# Patient Record
Sex: FEMALE | Race: OTHER | HISPANIC OR LATINO | URBAN - METROPOLITAN AREA
[De-identification: names, ages, dates, MRNs, and addresses within clinical notes are randomized per-mention and may not be internally consistent; named-entity substitution may affect disease eponyms.]

---

## 2024-11-18 ENCOUNTER — EMERGENCY (EMERGENCY)
Facility: HOSPITAL | Age: 49
LOS: 1 days | Discharge: HOME | End: 2024-11-18
Attending: EMERGENCY MEDICINE | Admitting: EMERGENCY MEDICINE
Payer: SELF-PAY

## 2024-11-18 VITALS
TEMPERATURE: 98 F | WEIGHT: 110.01 LBS | OXYGEN SATURATION: 98 % | DIASTOLIC BLOOD PRESSURE: 89 MMHG | SYSTOLIC BLOOD PRESSURE: 147 MMHG | RESPIRATION RATE: 16 BRPM | HEART RATE: 61 BPM

## 2024-11-18 LAB
ALBUMIN SERPL ELPH-MCNC: 2.9 G/DL — LOW (ref 3.4–5)
ALP SERPL-CCNC: 85 U/L — SIGNIFICANT CHANGE UP (ref 40–120)
ALT FLD-CCNC: 40 U/L — SIGNIFICANT CHANGE UP (ref 12–42)
ANION GAP SERPL CALC-SCNC: 4 MMOL/L — LOW (ref 9–16)
APAP SERPL-MCNC: <2 UG/ML — SIGNIFICANT CHANGE UP (ref 10–30)
APPEARANCE UR: CLEAR — SIGNIFICANT CHANGE UP
APTT BLD: 27.2 SEC — SIGNIFICANT CHANGE UP (ref 24.5–35.6)
AST SERPL-CCNC: 29 U/L — SIGNIFICANT CHANGE UP (ref 15–37)
BILIRUB SERPL-MCNC: 0.9 MG/DL — SIGNIFICANT CHANGE UP (ref 0.2–1.2)
BILIRUB UR-MCNC: NEGATIVE — SIGNIFICANT CHANGE UP
BUN SERPL-MCNC: 15 MG/DL — SIGNIFICANT CHANGE UP (ref 7–23)
CALCIUM SERPL-MCNC: 8 MG/DL — LOW (ref 8.5–10.5)
CHLORIDE SERPL-SCNC: 110 MMOL/L — HIGH (ref 96–108)
CK MB BLD-MCNC: 1.45 % — SIGNIFICANT CHANGE UP
CK MB CFR SERPL CALC: 2.4 NG/ML — SIGNIFICANT CHANGE UP (ref 0.5–3.6)
CK SERPL-CCNC: 165 U/L — SIGNIFICANT CHANGE UP (ref 26–192)
CO2 SERPL-SCNC: 28 MMOL/L — SIGNIFICANT CHANGE UP (ref 22–31)
COLOR SPEC: YELLOW — SIGNIFICANT CHANGE UP
CREAT SERPL-MCNC: 0.99 MG/DL — SIGNIFICANT CHANGE UP (ref 0.5–1.3)
DIFF PNL FLD: NEGATIVE — SIGNIFICANT CHANGE UP
EGFR: 71 ML/MIN/1.73M2 — SIGNIFICANT CHANGE UP
ETHANOL SERPL-MCNC: <3 MG/DL — SIGNIFICANT CHANGE UP
GLUCOSE SERPL-MCNC: 224 MG/DL — HIGH (ref 70–99)
GLUCOSE UR QL: 500 MG/DL
HCG SERPL-ACNC: 1 MIU/ML — SIGNIFICANT CHANGE UP
HCT VFR BLD CALC: 32.6 % — LOW (ref 34.5–45)
HGB BLD-MCNC: 10.5 G/DL — LOW (ref 11.5–15.5)
INR BLD: 1.03 — SIGNIFICANT CHANGE UP (ref 0.85–1.16)
KETONES UR-MCNC: NEGATIVE MG/DL — SIGNIFICANT CHANGE UP
LEUKOCYTE ESTERASE UR-ACNC: NEGATIVE — SIGNIFICANT CHANGE UP
MAGNESIUM SERPL-MCNC: 1.9 MG/DL — SIGNIFICANT CHANGE UP (ref 1.6–2.6)
MCHC RBC-ENTMCNC: 32.2 G/DL — SIGNIFICANT CHANGE UP (ref 32–36)
MCHC RBC-ENTMCNC: 33.7 PG — SIGNIFICANT CHANGE UP (ref 27–34)
MCV RBC AUTO: 104.5 FL — HIGH (ref 80–100)
NITRITE UR-MCNC: NEGATIVE — SIGNIFICANT CHANGE UP
NRBC # BLD: 0 /100 WBCS — SIGNIFICANT CHANGE UP (ref 0–0)
PCP SPEC-MCNC: SIGNIFICANT CHANGE UP
PH UR: 6.5 — SIGNIFICANT CHANGE UP (ref 5–8)
PLATELET # BLD AUTO: 271 K/UL — SIGNIFICANT CHANGE UP (ref 150–400)
POTASSIUM SERPL-MCNC: 3.8 MMOL/L — SIGNIFICANT CHANGE UP (ref 3.5–5.3)
POTASSIUM SERPL-SCNC: 3.8 MMOL/L — SIGNIFICANT CHANGE UP (ref 3.5–5.3)
PROT SERPL-MCNC: 6.4 G/DL — SIGNIFICANT CHANGE UP (ref 6.4–8.2)
PROT UR-MCNC: NEGATIVE MG/DL — SIGNIFICANT CHANGE UP
PROTHROM AB SERPL-ACNC: 11.9 SEC — SIGNIFICANT CHANGE UP (ref 9.9–13.4)
RBC # BLD: 3.12 M/UL — LOW (ref 3.8–5.2)
RBC # FLD: 13.7 % — SIGNIFICANT CHANGE UP (ref 10.3–14.5)
SALICYLATES SERPL-MCNC: <2 MG/DL — SIGNIFICANT CHANGE UP (ref 2.8–20)
SODIUM SERPL-SCNC: 142 MMOL/L — SIGNIFICANT CHANGE UP (ref 132–145)
SP GR SPEC: 1.02 — SIGNIFICANT CHANGE UP (ref 1–1.03)
TROPONIN I, HIGH SENSITIVITY RESULT: 21.9 NG/L — SIGNIFICANT CHANGE UP
UROBILINOGEN FLD QL: 1 MG/DL — SIGNIFICANT CHANGE UP (ref 0.2–1)
WBC # BLD: 5.97 K/UL — SIGNIFICANT CHANGE UP (ref 3.8–10.5)
WBC # FLD AUTO: 5.97 K/UL — SIGNIFICANT CHANGE UP (ref 3.8–10.5)

## 2024-11-18 PROCEDURE — 71045 X-RAY EXAM CHEST 1 VIEW: CPT | Mod: 26

## 2024-11-18 PROCEDURE — 99285 EMERGENCY DEPT VISIT HI MDM: CPT

## 2024-11-18 RX ORDER — SODIUM CHLORIDE 9 MG/ML
1000 INJECTION, SOLUTION INTRAMUSCULAR; INTRAVENOUS; SUBCUTANEOUS ONCE
Refills: 0 | Status: COMPLETED | OUTPATIENT
Start: 2024-11-18 | End: 2024-11-18

## 2024-11-18 RX ADMIN — SODIUM CHLORIDE 1000 MILLILITER(S): 9 INJECTION, SOLUTION INTRAMUSCULAR; INTRAVENOUS; SUBCUTANEOUS at 16:20

## 2024-11-18 RX ADMIN — SODIUM CHLORIDE 250 MILLILITER(S): 9 INJECTION, SOLUTION INTRAMUSCULAR; INTRAVENOUS; SUBCUTANEOUS at 17:15

## 2024-11-18 NOTE — ED PROVIDER NOTE - PATIENT PORTAL LINK FT
You can access the FollowMyHealth Patient Portal offered by [facility] by registering at the following website: [email or web address]. By joining Company’s FollowMyHealth portal, you will also be able to view your health information using other applications (apps) compatible with our system.

## 2024-11-18 NOTE — ED PROVIDER NOTE - NSFOLLOWUPINSTRUCTIONS_ED_ALL_ED_FT
Follow up with your primary care doctor or clinics listed below if you do [place] [address]  To make an appointment, call [phone]  [facility]  Address: [address] [place] [address]  Appointment Center: [phone] ([phone])    Follow up with your primary care doctor or clinics listed below if you do [place] [address]  To make an appointment, call [phone]  [facility]  Address: [address] [place] [address]  Appointment Center: [phone] ([phone])    Intoxicación accidental por drogas en los adultos  Accidental Drug Poisoning, Adult  Jayden intoxicación accidental por drogas ocurre cuando jayden persona rita accidentalmente demasiada cantidad de jayden sustancia marta, por ejemplo, un medicamento recetado, un medicamento de venta mejia, jayden vitamina, un suplemento o jayden droga ilegal. Los efectos de la intoxicación por drogas pueden ser leves, peligrosos o, incluso, mortales.    ¿Cuáles son las causas?  Esta afección se genera cuando se rita demasiada cantidad de un medicamento, jayden droga ilegal u otra sustancia. Por lo general, es consecuencia de lo siguiente:  Falta de información sobre jayden sustancia.  Usar más de jayden sustancia al mismo tiempo.  Un error cometido por el profesional de la antoni al prescribir o dispensar el fármaco.  Un lapsus de la memoria, marta olvidarse que ya tomó jayden dosis del medicamento.  Usar, de repente, alguna sustancia después de un antonella período de no usarla.  Las siguientes sustancias y medicamentos son más propensos a causar jayden intoxicación accidental por drogas:  Medicamentos para tratar afecciones de antoni mental (medicamentos psicotrópicos).  Analgésicos.  Cocaína.  Heroína.  Multivitaminas que contengan gely.  Medicamentos de venta mejia para el resfrío y la tos.  ¿Qué incrementa el riesgo?  Es más probable que esta afección ocurra en:  Los adultos mayores. Los adultos mayores corren riesgo debido a que pueden:  Estar tomando diferentes medicamentos.  Tener dificultades para leer las etiquetas.  Olvidarse de cuándo tomaron el medicamento por última vez.  Las personas que consumen drogas ilegales.  Personas que beben alcohol mientras usan drogas ilegales o ciertos medicamentos.  Personas que tienen determinadas enfermedades mentales.  ¿Cuáles son los signos o síntomas?  Los síntomas de esta afección dependen de la sustancia y de la cantidad ingerida. Los síntomas frecuentes incluyen los siguientes:  Los cambios de conducta marta por ejemplo, confusión.  Somnolencia.  Debilidad.  Respiración lenta.  Náuseas y vómitos.  Convulsiones.  Tamaño muy chritsian o pequeño de la pupila que no cambia en respuesta a los cambios en la christophe.  Jayden intoxicación por drogas puede causar jayden afección muy grave en la cual la presión arterial  a un nivel bajo (choque). Los síntomas del choque incluyen los siguientes:  Piel fría, húmeda o pálida.  Labios azules.  Respiración muy lenta.  Mucha somnolencia.  Confusión grave.  Mareos o desmayos.  ¿Cómo se diagnostica?  Esta afección se diagnostica en función de lo siguiente:  Priscilla síntomas. Se le preguntará sobre las sustancias consumió y cuándo lo hizo.  Un examen físico.  También pueden hacerle pruebas, que incluyen las siguientes:  Análisis de orina.  Análisis de kwadwo.  Un electrocardiograma (ECG).  ¿Cómo se trata?  Es probable que esta afección se trate de inmediato en el hospital. El tratamiento puede implicar lo siguiente:  Recibir líquidos y electrolitos por vía intravenosa. Los electrolitos son sales y minerales en la kwadwo.  La colocación de un tubo de respiración (tubo endotraqueal) en las vías respiratorias para ayudarlo a respirar.  Manjeet o recibir medicamentos. Estos pueden incluir medicamentos para lo siguiente:  Absorber cualquier sustancia que se encuentre en el aparato digestivo.  Inhibir o revertir el efecto de la sustancia que causó la intoxicación.  Filtrar la kwadwo a través de un riñón artificial (hemodiálisis).  Asesoramiento psicológico y asistencia de antoni mental. Probablemente le indiquen esto si la sobredosis fue causada por el consumo de drogas.  Siga estas instrucciones en rose casa:  Medicamentos    A prescription pill bottle with an example of a pill.  [place] los medicamentos de venta mejia y los recetados solamente marta se lo haya indicado el médico.  Antes de manjeet un nuevo medicamento, pregunte a rose médico si el medicamento:  Pueden provocar efectos secundarios.  Podría reaccionar con otros medicamentos.  Tenga un listado de todos los medicamentos que rita, incluso los medicamentos de venta mejia, vitaminas, suplementos y hierbas. Lleve esta lista con usted a todas las consultas con el médico.  Instrucciones generales    Three cups showing dark yellow, yellow, and pale yellow urine.  Rena suficiente líquido marta para mantener la orina de color amarillo pálido.  Si se atiende con un psicólogo o profesional de la antoni mental, asegúrese de seguir priscilla instrucciones.  No rena alcohol si:  Rose médico le indica no hacerlo.  Está embarazada, puede estar embarazada o está tratando de quedar embarazada.  Si padma alcohol:  Limite la cantidad que padma a lo siguiente:  De 0 a 1 medida por día para las mujeres.  De 0 a 2 medidas por día para los hombres.  Sepa cuánta cantidad de alcohol hay en las bebidas que rita. En los [place], jayden medida equivale a jayden botella de cerveza de 12 oz (355 ml), un vaso de vino de 5 oz (148 ml) o un vaso de jayden bebida alcohólica de papa graduación de 1½ oz (44 ml).  Concurra a todas las visitas de seguimiento. [place] es importante.  ¿Cómo se rosalie?  A pillbox with seven slots for storing pills. The lids for three slots are open, showing pills in two of the slots.   Trate de obtener ayuda si está luchando contra:  El consumo de alcohol o drogas.  La depresión u otra afección de antoni mental.  Tenga el número telefónico del centro de toxicología local cerca de rose teléfono fijo o agéndelo en rose teléfono móvil. La línea directa de la American Association of Poison Control Centers (Asociación Estadounidense de Centros de Control de Intoxicaciones) es [phone].  Rianna los prospectos que vienen con los medicamentos.  Sanjuanita un sistema para manjeet los medicamentos por ejemplo, usar un pastillero, que lo ayudará a evitar que tome de más.  No rena alcohol mientras rita medicamentos a menos que el médico lo autorice.  No consuma drogas ilegales.  No tome medicamentos que no le haya recetado el médico.  Comuníquese con un médico si:  Los síntomas vuelven a aparecer.  Le aparecen síntomas nuevos o efectos secundarios luego de manjeet los medicamentos.  Tiene preguntas sobre jayden posible intoxicación por drogas. Llame a [facility] de toxicología local al [phone].  Solicite ayuda de inmediato si:  Sanjuanita que usted u otra persona rodriguez consumido jayden cantidad excesiva de jayden sustancia.  Usted u otra persona tienen síntomas de jayden intoxicación accidental por drogas:  Los cambios de conducta marta por ejemplo, confusión.  Somnolencia.  Respiración lenta.  Convulsiones.  Estos síntomas pueden indicar jayden emergencia. Solicite ayuda de inmediato. Llame al 911.  No espere a raquel si los síntomas desaparecen.  No conduzca por priscilla propios medios hasta el hospital.  Resumen  Jayden intoxicación accidental por drogas ocurre cuando jayden persona rita accidentalmente demasiada cantidad de jayden sustancia marta, por ejemplo, un medicamento recetado, un medicamento de venta mejia, jayden vitamina, un suplemento o jayden droga ilegal.  Esta afección se diagnostica en función de los síntomas y de un examen físico. Rose médico le preguntará que sustancias consumió y cuándo.  Los efectos de la intoxicación por drogas pueden ser leves, peligrosos o, incluso, mortales.  Es probable que esta afección se trate de inmediato en el hospital. Follow up with your primary care doctor or clinics listed below if you do not have a doctor  [address] [address]  To make an appointment, call [phone]  [facility]  Address:  [address] [place] [address]  Appointment Center: [phone] ([phone])    Josiah un seguimiento con rose médico de atención primaria o las clínicas que se enumeran a continuación si no tiene un médico  [place] [address], [place] [address]  Para programar jayden johanna, alona melvin [phone]  [facility]  Dirección:  [address] [place] [address]  Centro de citas: [phone] ([phone])    Intoxicación accidental por drogas en los adultos  Accidental Drug Poisoning, Adult  Jayden intoxicación accidental por drogas ocurre cuando jayden persona rita accidentalmente demasiada cantidad de jayden sustancia marta, por ejemplo, un medicamento recetado, un medicamento de venta mejia, jayden vitamina, un suplemento o jayden droga ilegal. Los efectos de la intoxicación por drogas pueden ser leves, peligrosos o, incluso, mortales.    ¿Cuáles son las causas?  Esta afección se genera cuando se rita demasiada cantidad de un medicamento, jayden droga ilegal u otra sustancia. Por lo general, es consecuencia de lo siguiente:  Falta de información sobre jayden sustancia.  Usar más de jayden sustancia al mismo tiempo.  Un error cometido por el profesional de la antoni al prescribir o dispensar el fármaco.  Un lapsus de la memoria, marta olvidarse que ya tomó jayden dosis del medicamento.  Usar, de repente, alguna sustancia después de un antonella período de no usarla.  Las siguientes sustancias y medicamentos son más propensos a causar jayden intoxicación accidental por drogas:  Medicamentos para tratar afecciones de antoni mental (medicamentos psicotrópicos).  Analgésicos.  Cocaína.  Heroína.  Multivitaminas que contengan gely.  Medicamentos de venta mejia para el resfrío y la tos.  ¿Qué incrementa el riesgo?  Es más probable que esta afección ocurra en:  Los adultos mayores. Los adultos mayores corren riesgo debido a que pueden:  Estar tomando diferentes medicamentos.  Tener dificultades para leer las etiquetas.  Olvidarse de cuándo tomaron el medicamento por última vez.  Las personas que consumen drogas ilegales.  Personas que beben alcohol mientras usan drogas ilegales o ciertos medicamentos.  Personas que tienen determinadas enfermedades mentales.  ¿Cuáles son los signos o síntomas?  Los síntomas de esta afección dependen de la sustancia y de la cantidad ingerida. Los síntomas frecuentes incluyen los siguientes:  Los cambios de conducta marta por ejemplo, confusión.  Somnolencia.  Debilidad.  Respiración lenta.  Náuseas y vómitos.  Convulsiones.  Tamaño muy christian o pequeño de la pupila que no cambia en respuesta a los cambios en la christophe.  Jayden intoxicación por drogas puede causar jayden afección muy grave en la cual la presión arterial  a un nivel bajo (choque). Los síntomas del choque incluyen los siguientes:  Piel fría, húmeda o pálida.  Labios azules.  Respiración muy lenta.  Mucha somnolencia.  Confusión grave.  Mareos o desmayos.  ¿Cómo se diagnostica?  Esta afección se diagnostica en función de lo siguiente:  Priscilla síntomas. Se le preguntará sobre las sustancias consumió y cuándo lo hizo.  Un examen físico.  También pueden hacerle pruebas, que incluyen las siguientes:  Análisis de orina.  Análisis de kwadwo.  Un electrocardiograma (ECG).  ¿Cómo se trata?  Es probable que esta afección se trate de inmediato en el hospital. El tratamiento puede implicar lo siguiente:  Recibir líquidos y electrolitos por vía intravenosa. Los electrolitos son sales y minerales en la kwadwo.  La colocación de un tubo de respiración (tubo endotraqueal) en las vías respiratorias para ayudarlo a respirar.  Manjeet o recibir medicamentos. Estos pueden incluir medicamentos para lo siguiente:  Absorber cualquier sustancia que se encuentre en el aparato digestivo.  Inhibir o revertir el efecto de la sustancia que causó la intoxicación.  Filtrar la kwadwo a través de un riñón artificial (hemodiálisis).  Asesoramiento psicológico y asistencia de antoni mental. Probablemente le indiquen esto si la sobredosis fue causada por el consumo de drogas.  Siga estas instrucciones en rose casa:  Medicamentos    A prescription pill bottle with an example of a pill.  [place] los medicamentos de venta mejia y los recetados solamente marta se lo haya indicado el médico.  Antes de manjeet un nuevo medicamento, pregunte a rose médico si el medicamento:  Pueden provocar efectos secundarios.  Podría reaccionar con otros medicamentos.  Tenga un listado de todos los medicamentos que rita, incluso los medicamentos de venta mejia, vitaminas, suplementos y hierbas. Lleve esta lista con usted a todas las consultas con el médico.  Instrucciones generales    Three cups showing dark yellow, yellow, and pale yellow urine.  Rena suficiente líquido marta para mantener la orina de color amarillo pálido.  Si se atiende con un psicólogo o profesional de la antoni mental, asegúrese de seguir priscilla instrucciones.  No rena alcohol si:  Rose médico le indica no hacerlo.  Está embarazada, puede estar embarazada o está tratando de quedar embarazada.  Si padma alcohol:  Limite la cantidad que padma a lo siguiente:  De 0 a 1 medida por día para las mujeres.  De 0 a 2 medidas por día para los hombres.  Sepa cuánta cantidad de alcohol hay en las bebidas que rita. En los [place], jayden medida equivale a jayden botella de cerveza de 12 oz (355 ml), un vaso de vino de 5 oz (148 ml) o un vaso de jayden bebida alcohólica de papa graduación de 1½ oz (44 ml).  Concurra a todas las visitas de seguimiento. [place] es importante.  ¿Cómo se rosalie?  A pillbox with seven slots for storing pills. The lids for three slots are open, showing pills in two of the slots.   Trate de obtener ayuda si está luchando contra:  El consumo de alcohol o drogas.  La depresión u otra afección de antoni mental.  Tenga el número telefónico del centro de toxicología local cerca de rose teléfono fijo o agéndelo en rose teléfono móvil. La línea directa de la American Association of Poison Control Centers (Asociación Estadounidense de Centros de Control de Intoxicaciones) es [phone].  [place] los prospectos que vienen con los medicamentos.  Sanjuanita un sistema para manjeet los medicamentos por ejemplo, usar un pastillero, que lo ayudará a evitar que tome de más.  No rena alcohol mientras rita medicamentos a menos que el médico lo autorice.  No consuma drogas ilegales.  No tome medicamentos que no le haya recetado el médico.  Comuníquese con un médico si:  Los síntomas vuelven a aparecer.  Le aparecen síntomas nuevos o efectos secundarios luego de manjeet los medicamentos.  Tiene preguntas sobre jayden posible intoxicación por drogas. Llame a [facility] de toxicología local al [phone].  Solicite ayuda de inmediato si:  Sanjuanita que usted u otra persona rodriguez consumido jayden cantidad excesiva de jayden sustancia.  Usted u otra persona tienen síntomas de jayden intoxicación accidental por drogas:  Los cambios de conducta marta por ejemplo, confusión.  Somnolencia.  Respiración lenta.  Convulsiones.  Estos síntomas pueden indicar jayden emergencia. Solicite ayuda de inmediato. Llame al 911.  No espere a raquel si los síntomas desaparecen.  No conduzca por priscilla propios medios hasta el hospital.  Resumen  Jayden intoxicación accidental por drogas ocurre cuando jayden persona rita accidentalmente demasiada cantidad de jayden sustancia marta, por ejemplo, un medicamento recetado, un medicamento de venta mejia, jayden vitamina, un suplemento o jayden droga ilegal.  Esta afección se diagnostica en función de los síntomas y de un examen físico. Rose médico le preguntará que sustancias consumió y cuándo.  Los efectos de la intoxicación por drogas pueden ser leves, peligrosos o, incluso, mortales.  Es probable que esta afección se trate de inmediato en el hospital.

## 2024-11-18 NOTE — ED ADULT TRIAGE NOTE - CHIEF COMPLAINT QUOTE
Pt BIBA with AMS. Pt reports becoming altered shortly after consuming "a pill" of unknown origin. Vomited PTA. VSS. Responded to pain in triage.

## 2024-11-18 NOTE — ED PROVIDER NOTE - OBJECTIVE STATEMENT
Patient is a 49-year-old female who presents to the emergency department with altered mental status.  I spoke with [facility] who   Brought patient in to the ER  they state that the patient took some unknown  pills from a bodega and then felt weak afterwards. Patient is a 49-year-old female who presents to the emergency department with altered mental status.  I spoke with [facility] who brought patient in to the ER  they state that the patient took some unknown  pills from a bodega and then felt weak afterwards.

## 2024-11-18 NOTE — ED PROVIDER NOTE - CLINICAL SUMMARY MEDICAL DECISION MAKING FREE TEXT BOX
Patient is a 49-year-old female who presents to the emergency department with altered mental status.  I spoke with [facility] who   Brought patient in to the ER  they state that the patient took some unknown  pills from a bodega and then felt weak afterwards.    CBC, CMP, troponin, CPK, alcohol, acetaminophen, salicylate  level, chest x-ray  EKG ordered.  1L NSS ordered. Patient is a 49-year-old female who presents to the emergency department with altered mental status.  I spoke with [facility] who brought patient in to the ER  they state that the patient took some unknown  pills from a bodega and then felt weak afterwards.    CBC, CMP, troponin, CPK, alcohol, acetaminophen, salicylate  level, chest x-ray  EKG ordered.  1L NSS ordered. Patient is a 49-year-old female who presents to the emergency department with altered mental status.  I spoke with [facility] who brought patient in to the ER  they state that the patient took some unknown  pills from a bodega and then felt weak afterwards.    CBC, CMP, troponin, CPK, alcohol, acetaminophen, salicylate  level, chest x-ray  EKG ordered.  1L NSS ordered.    6:07pm patient awake and alert with spouse at bedside, gait steady Patient is a 49-year-old female who presents to the emergency department with altered mental status.  I spoke with [facility] who brought patient in to the ER  they state that the patient took some unknown  pills from a bodega and then felt weak afterwards.    CBC, CMP, troponin, CPK, alcohol, acetaminophen, salicylate  level, chest x-ray  EKG ordered.  1L NSS ordered.    6:07pm patient awake and alert with spouse at bedside, gait steady.  THC in [place].

## 2024-11-18 NOTE — ED ADULT NURSE NOTE - NSFALLUNIVINTERV_ED_ALL_ED
Bed/Stretcher in lowest position, wheels locked, appropriate side rails in place/Call bell, personal items and telephone in reach/Instruct patient to call for assistance before getting out of bed/chair/stretcher/Non-slip footwear applied when patient is off stretcher/[place] to call system/Physically safe environment - no spills, clutter or unnecessary equipment/Purposeful proactive rounding/Room/bathroom lighting operational, light cord in reach

## 2024-11-20 DIAGNOSIS — R41.82 ALTERED MENTAL STATUS, UNSPECIFIED: ICD-10-CM
